# Patient Record
Sex: MALE | Race: WHITE | NOT HISPANIC OR LATINO | Employment: FULL TIME | URBAN - METROPOLITAN AREA
[De-identification: names, ages, dates, MRNs, and addresses within clinical notes are randomized per-mention and may not be internally consistent; named-entity substitution may affect disease eponyms.]

---

## 2020-01-13 ENCOUNTER — OCCUPATIONAL HEALTH (OUTPATIENT)
Dept: URGENT CARE | Facility: CLINIC | Age: 50
End: 2020-01-13

## 2020-01-13 DIAGNOSIS — Z02.83 ENCOUNTER FOR EMPLOYMENT-RELATED DRUG TESTING: ICD-10-CM

## 2020-01-13 PROCEDURE — 80305 DRUG TEST PRSMV DIR OPT OBS: CPT | Mod: S$GLB,,, | Performed by: EMERGENCY MEDICINE

## 2020-01-13 PROCEDURE — 80305 PR COLLECTION ONLY DRUG SCREEN: ICD-10-PCS | Mod: S$GLB,,, | Performed by: EMERGENCY MEDICINE

## 2020-01-24 ENCOUNTER — OCCUPATIONAL HEALTH (OUTPATIENT)
Dept: URGENT CARE | Facility: CLINIC | Age: 50
End: 2020-01-24

## 2020-01-24 PROCEDURE — 99499 UNLISTED E&M SERVICE: CPT | Mod: S$GLB,,, | Performed by: NURSE PRACTITIONER

## 2020-01-24 PROCEDURE — 99499 PR PHYSICAL - DOT/CDL: ICD-10-PCS | Mod: S$GLB,,, | Performed by: NURSE PRACTITIONER

## 2020-10-05 ENCOUNTER — OCCUPATIONAL HEALTH (OUTPATIENT)
Dept: URGENT CARE | Facility: CLINIC | Age: 50
End: 2020-10-05

## 2020-10-05 DIAGNOSIS — Z02.83 ENCOUNTER FOR EMPLOYMENT-RELATED DRUG TESTING: ICD-10-CM

## 2020-10-05 PROCEDURE — 80305 PR COLLECTION ONLY DRUG SCREEN: ICD-10-PCS | Mod: S$GLB,,, | Performed by: EMERGENCY MEDICINE

## 2020-10-05 PROCEDURE — 80305 DRUG TEST PRSMV DIR OPT OBS: CPT | Mod: S$GLB,,, | Performed by: EMERGENCY MEDICINE

## 2021-01-23 ENCOUNTER — OCCUPATIONAL HEALTH (OUTPATIENT)
Dept: URGENT CARE | Facility: CLINIC | Age: 51
End: 2021-01-23

## 2021-01-23 PROCEDURE — 99499 PR PHYSICAL - DOT/CDL: ICD-10-PCS | Mod: S$GLB,,, | Performed by: NURSE PRACTITIONER

## 2021-01-23 PROCEDURE — 99499 UNLISTED E&M SERVICE: CPT | Mod: S$GLB,,, | Performed by: NURSE PRACTITIONER

## 2021-04-13 ENCOUNTER — CLINICAL SUPPORT (OUTPATIENT)
Dept: URGENT CARE | Facility: CLINIC | Age: 51
End: 2021-04-13

## 2021-04-13 ENCOUNTER — OCCUPATIONAL HEALTH (OUTPATIENT)
Dept: URGENT CARE | Facility: CLINIC | Age: 51
End: 2021-04-13

## 2021-04-13 PROCEDURE — 99499 UNLISTED E&M SERVICE: CPT | Mod: S$GLB,,, | Performed by: EMERGENCY MEDICINE

## 2021-04-13 PROCEDURE — 80305 PR COLLECTION ONLY DRUG SCREEN: ICD-10-PCS | Mod: S$GLB,,, | Performed by: EMERGENCY MEDICINE

## 2021-04-13 PROCEDURE — 80305 DRUG TEST PRSMV DIR OPT OBS: CPT | Mod: S$GLB,,, | Performed by: EMERGENCY MEDICINE

## 2021-04-13 PROCEDURE — 99499 PR PHYSICAL - DOT/CDL: ICD-10-PCS | Mod: S$GLB,,, | Performed by: EMERGENCY MEDICINE

## 2022-04-05 ENCOUNTER — OCCUPATIONAL HEALTH (OUTPATIENT)
Dept: URGENT CARE | Facility: CLINIC | Age: 52
End: 2022-04-05
Payer: COMMERCIAL

## 2022-04-05 PROCEDURE — 99499 PR PHYSICAL - DOT/CDL: ICD-10-PCS | Mod: S$GLB,,, | Performed by: EMERGENCY MEDICINE

## 2022-04-05 PROCEDURE — 99499 UNLISTED E&M SERVICE: CPT | Mod: S$GLB,,, | Performed by: EMERGENCY MEDICINE

## 2022-09-29 ENCOUNTER — OCCUPATIONAL HEALTH (OUTPATIENT)
Dept: URGENT CARE | Facility: CLINIC | Age: 52
End: 2022-09-29

## 2022-09-29 PROCEDURE — 80305 DRUG TEST PRSMV DIR OPT OBS: CPT | Mod: S$GLB,,, | Performed by: EMERGENCY MEDICINE

## 2022-09-29 PROCEDURE — 99499 PR PHYSICAL - DOT/CDL: ICD-10-PCS | Mod: S$GLB,,, | Performed by: EMERGENCY MEDICINE

## 2022-09-29 PROCEDURE — 80305 PR COLLECTION ONLY DRUG SCREEN: ICD-10-PCS | Mod: S$GLB,,, | Performed by: EMERGENCY MEDICINE

## 2022-09-29 PROCEDURE — 99499 UNLISTED E&M SERVICE: CPT | Mod: S$GLB,,, | Performed by: EMERGENCY MEDICINE

## 2023-02-09 ENCOUNTER — OFFICE VISIT (OUTPATIENT)
Dept: URGENT CARE | Facility: CLINIC | Age: 53
End: 2023-02-09
Payer: OTHER MISCELLANEOUS

## 2023-02-09 VITALS
HEIGHT: 73 IN | WEIGHT: 293.63 LBS | DIASTOLIC BLOOD PRESSURE: 69 MMHG | HEART RATE: 88 BPM | TEMPERATURE: 97 F | RESPIRATION RATE: 20 BRPM | BODY MASS INDEX: 38.92 KG/M2 | OXYGEN SATURATION: 98 % | SYSTOLIC BLOOD PRESSURE: 106 MMHG

## 2023-02-09 DIAGNOSIS — Z02.6 ENCOUNTER RELATED TO WORKER'S COMPENSATION CLAIM: Primary | ICD-10-CM

## 2023-02-09 DIAGNOSIS — M25.562 ACUTE PAIN OF LEFT KNEE: ICD-10-CM

## 2023-02-09 PROCEDURE — 99214 PR OFFICE/OUTPT VISIT, EST, LEVL IV, 30-39 MIN: ICD-10-PCS | Mod: S$GLB,,, | Performed by: NURSE PRACTITIONER

## 2023-02-09 PROCEDURE — 73562 X-RAY EXAM OF KNEE 3: CPT | Mod: LT,S$GLB,, | Performed by: RADIOLOGY

## 2023-02-09 PROCEDURE — 73562 XR KNEE 3 VIEW LEFT: ICD-10-PCS | Mod: LT,S$GLB,, | Performed by: RADIOLOGY

## 2023-02-09 PROCEDURE — 99214 OFFICE O/P EST MOD 30 MIN: CPT | Mod: S$GLB,,, | Performed by: NURSE PRACTITIONER

## 2023-02-09 RX ORDER — CELECOXIB 200 MG/1
200 CAPSULE ORAL 2 TIMES DAILY PRN
Qty: 14 CAPSULE | Refills: 0 | Status: SHIPPED | OUTPATIENT
Start: 2023-02-09 | End: 2023-02-16

## 2023-02-09 NOTE — PROGRESS NOTES
"Subjective:       Patient ID: Cooper Ruggiero is a 52 y.o. male.    Chief Complaint: W/C N/V    W/C N/V DOI: 2/7/23 " Pt states his left knee just started hurting two days ago. 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      Constitution: Negative for chills and fever.   Musculoskeletal:  Positive for pain, joint pain, joint swelling, abnormal ROM of joint and pain with walking. Negative for trauma.   Skin:  Negative for erythema and bruising.   Neurological:  Negative for numbness and tingling.      Objective:      Physical Exam  Vitals and nursing note reviewed.   Constitutional:       General: He is not in acute distress.     Appearance: He is obese. He is ill-appearing. He is not toxic-appearing or diaphoretic.   HENT:      Head: Normocephalic and atraumatic.      Right Ear: External ear normal.      Left Ear: External ear normal.      Nose: Nose normal.      Mouth/Throat:      Mouth: Mucous membranes are moist.      Pharynx: Oropharynx is clear.   Eyes:      General: No scleral icterus.     Conjunctiva/sclera: Conjunctivae normal.   Cardiovascular:      Rate and Rhythm: Normal rate and regular rhythm.      Pulses: Normal pulses.      Heart sounds: Normal heart sounds.   Pulmonary:      Effort: Pulmonary effort is normal. No accessory muscle usage or respiratory distress.      Breath sounds: Normal air entry. No stridor. Examination of the right-upper field reveals wheezing and rhonchi. Examination of the left-upper field reveals wheezing and rhonchi. Examination of the right-middle field reveals wheezing. Examination of the left-middle field reveals wheezing. Examination of the right-lower field reveals decreased breath sounds. Examination of the left-lower field reveals wheezing. Decreased breath sounds, wheezing and rhonchi present. No rales.      Comments: Pt. Reports Being treated " "for "pneumonia" by PCP  Musculoskeletal:      Cervical back: Normal range of motion. No tenderness.      Left knee: Swelling and effusion present. No deformity, erythema, ecchymosis, lacerations, bony tenderness or crepitus. Decreased range of motion. Tenderness (generalized) present. No medial joint line, lateral joint line, MCL, LCL, ACL, PCL or patellar tendon tenderness. No LCL laxity, MCL laxity, ACL laxity or PCL laxity.Normal alignment, normal meniscus and normal patellar mobility. Normal pulse.      Instability Tests: Anterior drawer test negative. Posterior drawer test negative.        Legs:    Skin:     General: Skin is warm and dry.      Capillary Refill: Capillary refill takes 2 to 3 seconds.      Findings: No erythema.   Neurological:      Mental Status: He is alert and oriented to person, place, and time.   Psychiatric:         Behavior: Behavior normal.         Thought Content: Thought content normal.         Judgment: Judgment normal.       Assessment:       1. Encounter related to worker's compensation claim    2. Acute pain of left knee          Plan:         Medications Ordered This Encounter   Medications    celecoxib (CELEBREX) 200 MG capsule     Sig: Take 1 capsule (200 mg total) by mouth 2 (two) times daily as needed for Pain.     Dispense:  14 capsule     Refill:  0     Patient Instructions: Attention not to aggravate affected area, Elevated affected area, Referral to specialist to be scheduled, once authorized, Use splint as directed, Use Crutches as directed   Restrictions: Avoid climbing/kneeling/squatting, Avoid frequent bending/lifting/twisting, No Prolonged standing/walking, No driving company vehicles  Follow up if symptoms worsen or fail to improve.         I have discussed the xray results and physical exam findings with the patient.  We discussed symptom monitoring, conservative care methods, medication use, and follow up orders. He verbalized understanding and agreement with the " plan of care.        Rest, and elevate left knee until better  Apply ice for 15 minutes every 2 hours. After 48 hours, may use moist heat or heating pad  Wear immobilizer at all times for comfort and stability   takes Celebrex as needed for pain.  Do not take any additional over-the-counter NSAIDs.  May add Tylenol between doses as needed  Follow-up with orthopedic referral once scheduled  Return to this clinic 02/16/23 for reevaluation and further management   Follow up with [facility]  [facility] [address] [address]  [phone]  Return to clinic as needed for new or worse symptoms.      EXAMINATION:  XR KNEE 3 VIEW LEFT     CLINICAL HISTORY:  Encounter for examination for insurance purposes     TECHNIQUE:  AP, lateral, and Merchant views of the left knee were performed.     COMPARISON:  None     FINDINGS:  There is no evidence fracture nor malalignment.  There is a joint effusion.     Impression:     There is a left knee joint effusion.        Electronically signed by: Dona Marin MD  Date:                                            02/09/2023  Time:                                           12:28

## 2023-02-09 NOTE — PATIENT INSTRUCTIONS
Rest, and elevate left knee until better  Apply ice for 15 minutes every 2 hours. After 48 hours, may use moist heat or heating pad  Wear immobilizer at all times for comfort and stability   takes Celebrex as needed for pain.  Do not take any additional over-the-counter NSAIDs.  May add Tylenol between doses as needed  Follow-up with orthopedic referral once scheduled  Return to this clinic 02/16/23 for reevaluation and further management   Follow up with [facility]  [facility] [address] [address]  [phone]  Return to clinic as needed for new or worse symptoms.

## 2023-08-31 ENCOUNTER — OFFICE VISIT (OUTPATIENT)
Dept: CARDIOLOGY | Facility: CLINIC | Age: 53
End: 2023-08-31
Payer: COMMERCIAL

## 2023-08-31 VITALS
HEART RATE: 103 BPM | BODY MASS INDEX: 38.83 KG/M2 | OXYGEN SATURATION: 97 % | WEIGHT: 293 LBS | HEIGHT: 73 IN | SYSTOLIC BLOOD PRESSURE: 87 MMHG | DIASTOLIC BLOOD PRESSURE: 55 MMHG

## 2023-08-31 DIAGNOSIS — F17.200 CURRENT SMOKER: ICD-10-CM

## 2023-08-31 DIAGNOSIS — I10 ESSENTIAL HYPERTENSION: ICD-10-CM

## 2023-08-31 DIAGNOSIS — E78.2 MIXED HYPERLIPIDEMIA: ICD-10-CM

## 2023-08-31 DIAGNOSIS — R94.31 NONSPECIFIC ABNORMAL ELECTROCARDIOGRAM (ECG) (EKG): ICD-10-CM

## 2023-08-31 DIAGNOSIS — E66.01 SEVERE OBESITY: ICD-10-CM

## 2023-08-31 DIAGNOSIS — R42 DIZZINESS: ICD-10-CM

## 2023-08-31 DIAGNOSIS — R42 ORTHOSTATIC DIZZINESS: Primary | ICD-10-CM

## 2023-08-31 DIAGNOSIS — E08.65 DIABETES MELLITUS DUE TO UNDERLYING CONDITION WITH HYPERGLYCEMIA, WITHOUT LONG-TERM CURRENT USE OF INSULIN: ICD-10-CM

## 2023-08-31 PROCEDURE — 3074F SYST BP LT 130 MM HG: CPT | Mod: CPTII,S$GLB,, | Performed by: INTERNAL MEDICINE

## 2023-08-31 PROCEDURE — 99205 PR OFFICE/OUTPT VISIT, NEW, LEVL V, 60-74 MIN: ICD-10-PCS | Mod: S$GLB,,, | Performed by: INTERNAL MEDICINE

## 2023-08-31 PROCEDURE — 1160F PR REVIEW ALL MEDS BY PRESCRIBER/CLIN PHARMACIST DOCUMENTED: ICD-10-PCS | Mod: CPTII,S$GLB,, | Performed by: INTERNAL MEDICINE

## 2023-08-31 PROCEDURE — 4010F ACE/ARB THERAPY RXD/TAKEN: CPT | Mod: CPTII,S$GLB,, | Performed by: INTERNAL MEDICINE

## 2023-08-31 PROCEDURE — 3074F PR MOST RECENT SYSTOLIC BLOOD PRESSURE < 130 MM HG: ICD-10-PCS | Mod: CPTII,S$GLB,, | Performed by: INTERNAL MEDICINE

## 2023-08-31 PROCEDURE — 3008F BODY MASS INDEX DOCD: CPT | Mod: CPTII,S$GLB,, | Performed by: INTERNAL MEDICINE

## 2023-08-31 PROCEDURE — 99999 PR PBB SHADOW E&M-EST. PATIENT-LVL III: ICD-10-PCS | Mod: PBBFAC,,, | Performed by: INTERNAL MEDICINE

## 2023-08-31 PROCEDURE — 3008F PR BODY MASS INDEX (BMI) DOCUMENTED: ICD-10-PCS | Mod: CPTII,S$GLB,, | Performed by: INTERNAL MEDICINE

## 2023-08-31 PROCEDURE — 93000 ELECTROCARDIOGRAM COMPLETE: CPT | Mod: S$GLB,,, | Performed by: INTERNAL MEDICINE

## 2023-08-31 PROCEDURE — 1160F RVW MEDS BY RX/DR IN RCRD: CPT | Mod: CPTII,S$GLB,, | Performed by: INTERNAL MEDICINE

## 2023-08-31 PROCEDURE — 3078F DIAST BP <80 MM HG: CPT | Mod: CPTII,S$GLB,, | Performed by: INTERNAL MEDICINE

## 2023-08-31 PROCEDURE — 93000 EKG 12-LEAD: ICD-10-PCS | Mod: S$GLB,,, | Performed by: INTERNAL MEDICINE

## 2023-08-31 PROCEDURE — 1159F PR MEDICATION LIST DOCUMENTED IN MEDICAL RECORD: ICD-10-PCS | Mod: CPTII,S$GLB,, | Performed by: INTERNAL MEDICINE

## 2023-08-31 PROCEDURE — 99205 OFFICE O/P NEW HI 60 MIN: CPT | Mod: S$GLB,,, | Performed by: INTERNAL MEDICINE

## 2023-08-31 PROCEDURE — 3078F PR MOST RECENT DIASTOLIC BLOOD PRESSURE < 80 MM HG: ICD-10-PCS | Mod: CPTII,S$GLB,, | Performed by: INTERNAL MEDICINE

## 2023-08-31 PROCEDURE — 99999 PR PBB SHADOW E&M-EST. PATIENT-LVL III: CPT | Mod: PBBFAC,,, | Performed by: INTERNAL MEDICINE

## 2023-08-31 PROCEDURE — 1159F MED LIST DOCD IN RCRD: CPT | Mod: CPTII,S$GLB,, | Performed by: INTERNAL MEDICINE

## 2023-08-31 PROCEDURE — 4010F PR ACE/ARB THEARPY RXD/TAKEN: ICD-10-PCS | Mod: CPTII,S$GLB,, | Performed by: INTERNAL MEDICINE

## 2023-08-31 RX ORDER — LISINOPRIL 5 MG/1
5 TABLET ORAL DAILY
Qty: 90 TABLET | Refills: 3 | Status: SHIPPED | OUTPATIENT
Start: 2023-08-31 | End: 2023-11-14

## 2023-08-31 RX ORDER — LISINOPRIL AND HYDROCHLOROTHIAZIDE 12.5; 2 MG/1; MG/1
1 TABLET ORAL DAILY
COMMUNITY
Start: 2023-08-20

## 2023-08-31 RX ORDER — ALBUTEROL SULFATE 90 UG/1
2 AEROSOL, METERED RESPIRATORY (INHALATION) EVERY 4 HOURS PRN
COMMUNITY
Start: 2023-05-28

## 2023-08-31 RX ORDER — HYDROCHLOROTHIAZIDE 12.5 MG/1
12.5 TABLET ORAL DAILY
Qty: 30 TABLET | Refills: 11 | Status: SHIPPED | OUTPATIENT
Start: 2023-08-31 | End: 2023-11-14

## 2023-08-31 RX ORDER — CYCLOBENZAPRINE HCL 10 MG
10 TABLET ORAL NIGHTLY
COMMUNITY
Start: 2023-05-27 | End: 2023-08-31

## 2023-08-31 RX ORDER — DULAGLUTIDE 4.5 MG/.5ML
4.5 INJECTION, SOLUTION SUBCUTANEOUS
COMMUNITY
Start: 2023-08-16

## 2023-08-31 NOTE — PROGRESS NOTES
Subjective:    Patient ID:  Cooper Ruggiero is a 52 y.o. male     Chief Complaint   Patient presents with    Hypertension     New Patient establish care . Leg arm falls asleep, numbness at night time only .        HPI:  Mr Cooper Ruggiero is a 52 y.o. male here for initial consultation.    Patient is referred here for hypertension evaluation.    Patient has been doing well no specific complaints at the present time.  Today he felt dizzy while coming into the clinic.  Otherwise has been doing well denies any chest pain or tightness or heaviness denies any dizziness or lightheadedness or loss of consciousness.  And his breathing has been stable.  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  And he also has borderline diabetes.  Patient had been a smoker in the past had smoked for more than 30-35 years and he was initially heavy smoker of smoking about 2 packs per day and has decreased to half a packet per day.        Review of patient's allergies indicates:  No Known Allergies    No past medical history on file.  No past surgical history on file.     No family history on file.     Review of Systems:   Constitution: Negative for diaphoresis and fever.   HEENT: Negative for nosebleeds.    Cardiovascular: Negative for chest pain       No dyspnea on exertion       No leg swelling        No palpitations  Respiratory: Negative for shortness of breath and wheezing.    Hematologic/Lymphatic: Negative for bleeding problem. Does not bruise/bleed easily.   Skin: Negative for color change and rash.   Musculoskeletal: Negative for falls and myalgias.   Gastrointestinal: Negative for hematemesis and hematochezia.   Genitourinary: Negative for hematuria.   Neurological: Negative for dizziness and light-headedness.   Psychiatric/Behavioral: Negative for altered mental status and memory loss.          Objective:        Vitals:    08/31/23 1333   BP: (!) 87/55   Pulse:  "103       No results found for: "WBC", "HGB", "HCT", "PLT", "CHOL", "TRIG", "HDL", "LDLDIRECT", "ALT", "AST", "NA", "K", "CL", "CREATININE", "BUN", "CO2", "TSH", "PSA", "INR", "GLUF", "HGBA1C", "MICROALBUR"     ECHOCARDIOGRAM RESULTS  No results found for this or any previous visit.        CURRENT/PREVIOUS VISIT EKG  No results found for this or any previous visit.  No valid procedures specified.   No results found for this or any previous visit.      Physical Exam:  CONSTITUTIONAL: No fever, no chills  HEENT: Normocephalic, atraumatic,pupils reactive to light                 NECK:  No JVD no carotid bruit  CVS: S1S2+, RRR, systolic murmurs,   LUNGS: Clear  ABDOMEN: Soft, NT, BS+  EXTREMITIES: No cyanosis, edema  : No greenfield catheter  NEURO: AAO X 3  PSY: Normal affect      Medication List with Changes/Refills   Current Medications    ALBUTEROL (PROVENTIL/VENTOLIN HFA) 90 MCG/ACTUATION INHALER    2 puffs every 4 (four) hours as needed.    LISINOPRIL-HYDROCHLOROTHIAZIDE (PRINZIDE,ZESTORETIC) 20-12.5 MG PER TABLET    Take 1 tablet by mouth.    TRULICITY 4.5 MG/0.5 ML PEN INJECTOR    SMARTSI.5 Milligram(s) SUB-Q Once a Week   Discontinued Medications    CYCLOBENZAPRINE (FLEXERIL) 10 MG TABLET    Take 10 mg by mouth every evening.             Assessment:       1. Orthostatic dizziness    2. Essential hypertension    3. Mixed hyperlipidemia    4. Severe obesity    5. Nonspecific abnormal electrocardiogram (ECG) (EKG)    6. Current smoker         Plan:   1. Orthostatic dizziness   Patient is currently on lisinopril-hydrochlorothiazide 20-12.5 mg p.o. daily.  And at the present time patient's blood pressure is 87/55  Will suppress the medication need to decrease the dosage of the medication.  Will continue hydrochlorothiazide at 12.5 mg p.o. daily and patient will be on lisinopril 5 mg at bedtime.    Will increase the doses according to his blood pressure reading.  Patient to check his blood pressure as soon as he " wakes up, before breakfast before lunch before dinner and before going to bed and needs to do it for 5 days.  2. Essential hypertension   Will also do a 2D echocardiogram for LV function ejection fraction wall motion abnormality.    3. Nonspecific ST T-wave changes and EKG abnormality.    Patient has been a heavy smoker in the past and also has underlying obesity will need further cardiac evaluation with nonspecific ST T wave changes.  Will schedule him for Lexiscan Cardiolite study to evaluate for ischemia.  4. Mixed hyperlipidemia     Patient to follow-up with his primary physician in regards with his cholesterol and lipid panel and liver function tests.  5. Diabetes mellitus   Patient was started on Trulicity and he follows up with his primary physician in regards with is HbA1c.  6. EKG  Reviewed his EKG independently patient is in normal sinus rhythm with a heart rate of 93 beats per minute and nonspecific ST T-wave changes and normal intervals.  7. I will see him back in the office after the testing is completed.              Problem List Items Addressed This Visit    None  Visit Diagnoses       Orthostatic dizziness    -  Primary    Essential hypertension        Mixed hyperlipidemia        Severe obesity        Nonspecific abnormal electrocardiogram (ECG) (EKG)        Current smoker                No follow-ups on file.

## 2023-09-01 ENCOUNTER — TELEPHONE (OUTPATIENT)
Dept: CARDIOLOGY | Facility: CLINIC | Age: 53
End: 2023-09-01
Payer: COMMERCIAL

## 2023-09-01 DIAGNOSIS — R42 DIZZINESS: Primary | ICD-10-CM

## 2023-09-02 NOTE — TELEPHONE ENCOUNTER
----- Message from Ivory Ward RN sent at 2023  9:35 AM CDT -----  Regardin/31 EKG Order  The EKG performed on 23 is missing an order.  Please place an order so that the EKG can be read in [place].    Thank you,  Ivory Ward RN  [facility]   [facility]  3rd Floor Cardiology Clinic  [phone]/ [id number]

## 2023-10-10 ENCOUNTER — TELEPHONE (OUTPATIENT)
Dept: CARDIOLOGY | Facility: HOSPITAL | Age: 53
End: 2023-10-10

## 2023-10-10 NOTE — TELEPHONE ENCOUNTER
Patient advised, test will be at [facility] [facility]   Will need to register on the first floor at the main entrance.   Patient advised that arrival time is 9:15am.  Patient advised that he may be here about 2.5-3 hours, and may want to bring something to occupy their time, as there will be periods of waiting.    Patient advised, may take his medications prior to testing if you need to.  Advised if he needs to eat to take his medications, please keep it light, like toast and juice.    Patient advised to avoid all caffeine 12 hours prior to testing.  This includes decaf tea and coffee.    Will provide peanut butter crackers for a snack after stress test.  If patient would prefer something else, please bring a snack from home.    Wear comfortable clothing.   No lotions, oils, or powders to the upper chest area. May wear deodorant.    No metal jewelry, buttons, or zippers to the upper body.  Patient verbalizes understanding of instructions.

## 2023-10-11 ENCOUNTER — HOSPITAL ENCOUNTER (OUTPATIENT)
Dept: CARDIOLOGY | Facility: HOSPITAL | Age: 53
Discharge: HOME | End: 2023-10-11
Attending: INTERNAL MEDICINE
Payer: COMMERCIAL

## 2023-10-11 ENCOUNTER — HOSPITAL ENCOUNTER (OUTPATIENT)
Dept: RADIOLOGY | Facility: HOSPITAL | Age: 53
Discharge: HOME | End: 2023-10-11
Attending: INTERNAL MEDICINE
Payer: COMMERCIAL

## 2023-10-11 VITALS — HEIGHT: 73 IN | WEIGHT: 293 LBS | BODY MASS INDEX: 38.83 KG/M2

## 2023-10-11 DIAGNOSIS — R94.31 NONSPECIFIC ABNORMAL ELECTROCARDIOGRAM (ECG) (EKG): ICD-10-CM

## 2023-10-11 DIAGNOSIS — E78.2 MIXED HYPERLIPIDEMIA: ICD-10-CM

## 2023-10-11 DIAGNOSIS — E66.01 SEVERE OBESITY: ICD-10-CM

## 2023-10-11 DIAGNOSIS — I10 ESSENTIAL HYPERTENSION: ICD-10-CM

## 2023-10-11 DIAGNOSIS — F17.200 CURRENT SMOKER: ICD-10-CM

## 2023-10-11 DIAGNOSIS — R42 ORTHOSTATIC DIZZINESS: ICD-10-CM

## 2023-10-11 DIAGNOSIS — E08.65 DIABETES MELLITUS DUE TO UNDERLYING CONDITION WITH HYPERGLYCEMIA, WITHOUT LONG-TERM CURRENT USE OF INSULIN: ICD-10-CM

## 2023-10-11 LAB
AORTIC ROOT ANNULUS: 2.7 CM
AORTIC VALVE CUSP SEPERATION: 1.6 CM
AV INDEX (PROSTH): 0.94
AV MEAN GRADIENT: 4 MMHG
AV PEAK GRADIENT: 8 MMHG
AV VALVE AREA BY VELOCITY RATIO: 2.63 CM²
AV VALVE AREA: 2.96 CM²
AV VELOCITY RATIO: 0.84
BSA FOR ECHO PROCEDURE: 2.62 M2
CV ECHO LV RWT: 0.32 CM
DOP CALC AO PEAK VEL: 1.41 M/S
DOP CALC AO VTI: 22.9 CM
DOP CALC LVOT AREA: 3.1 CM2
DOP CALC LVOT DIAMETER: 2 CM
DOP CALC LVOT PEAK VEL: 1.18 M/S
DOP CALC LVOT STROKE VOLUME: 67.82 CM3
DOP CALCLVOT PEAK VEL VTI: 21.6 CM
E WAVE DECELERATION TIME: 201 MSEC
E/A RATIO: 0.84
E/E' RATIO: 8 M/S
ECHO LV POSTERIOR WALL: 0.78 CM (ref 0.6–1.1)
FRACTIONAL SHORTENING: 29 % (ref 28–44)
INTERVENTRICULAR SEPTUM: 1.07 CM (ref 0.6–1.1)
IVRT: 84 MSEC
LEFT ATRIUM SIZE: 3.7 CM
LEFT INTERNAL DIMENSION IN SYSTOLE: 3.53 CM (ref 2.1–4)
LEFT VENTRICLE DIASTOLIC VOLUME INDEX: 45.85 ML/M2
LEFT VENTRICLE DIASTOLIC VOLUME: 116 ML
LEFT VENTRICLE MASS INDEX: 64 G/M2
LEFT VENTRICLE SYSTOLIC VOLUME INDEX: 20.5 ML/M2
LEFT VENTRICLE SYSTOLIC VOLUME: 51.9 ML
LEFT VENTRICULAR INTERNAL DIMENSION IN DIASTOLE: 4.95 CM (ref 3.5–6)
LEFT VENTRICULAR MASS: 161.3 G
LV LATERAL E/E' RATIO: 8.44 M/S
LV SEPTAL E/E' RATIO: 7.6 M/S
LVOT MG: 3 MMHG
LVOT MV: 0.72 CM/S
MV PEAK A VEL: 0.91 M/S
MV PEAK E VEL: 0.76 M/S
MV STENOSIS PRESSURE HALF TIME: 47 MS
MV VALVE AREA P 1/2 METHOD: 4.68 CM2
PISA TR MAX VEL: 1.76 M/S
RIGHT VENTRICULAR END-DIASTOLIC DIMENSION: 2.84 CM
TDI LATERAL: 0.09 M/S
TDI SEPTAL: 0.1 M/S
TDI: 0.1 M/S
TR MAX PG: 12 MMHG
Z-SCORE OF LEFT VENTRICULAR DIMENSION IN END DIASTOLE: -11.15
Z-SCORE OF LEFT VENTRICULAR DIMENSION IN END SYSTOLE: -7.33

## 2023-10-11 PROCEDURE — 93306 TTE W/DOPPLER COMPLETE: CPT | Mod: 26,,, | Performed by: INTERNAL MEDICINE

## 2023-10-11 PROCEDURE — 78452 HT MUSCLE IMAGE SPECT MULT: CPT | Mod: 26,,, | Performed by: INTERNAL MEDICINE

## 2023-10-11 PROCEDURE — 93018 CV STRESS TEST I&R ONLY: CPT | Mod: ,,, | Performed by: INTERNAL MEDICINE

## 2023-10-11 PROCEDURE — 93018 NUCLEAR STRESS - CARDIOLOGY INTERPRETED (CUPID ONLY): ICD-10-PCS | Mod: ,,, | Performed by: INTERNAL MEDICINE

## 2023-10-11 PROCEDURE — 93016 CV STRESS TEST SUPVJ ONLY: CPT | Mod: ,,, | Performed by: NURSE PRACTITIONER

## 2023-10-11 PROCEDURE — 78452 HT MUSCLE IMAGE SPECT MULT: CPT

## 2023-10-11 PROCEDURE — 93016 NUCLEAR STRESS - CARDIOLOGY INTERPRETED (CUPID ONLY): ICD-10-PCS | Mod: ,,, | Performed by: NURSE PRACTITIONER

## 2023-10-11 PROCEDURE — 78452 NUCLEAR STRESS - CARDIOLOGY INTERPRETED (CUPID ONLY): ICD-10-PCS | Mod: 26,,, | Performed by: INTERNAL MEDICINE

## 2023-10-11 PROCEDURE — 93306 TTE W/DOPPLER COMPLETE: CPT

## 2023-10-11 PROCEDURE — 93306 ECHO (CUPID ONLY): ICD-10-PCS | Mod: 26,,, | Performed by: INTERNAL MEDICINE

## 2023-10-11 PROCEDURE — A9502 TC99M TETROFOSMIN: HCPCS

## 2023-10-11 RX ORDER — REGADENOSON 0.08 MG/ML
0.4 INJECTION, SOLUTION INTRAVENOUS ONCE
Status: COMPLETED | OUTPATIENT
Start: 2023-10-11 | End: 2023-10-11

## 2023-10-11 RX ADMIN — REGADENOSON 0.4 MG: 0.08 INJECTION, SOLUTION INTRAVENOUS at 09:10

## 2023-10-12 ENCOUNTER — HOSPITAL ENCOUNTER (OUTPATIENT)
Dept: RADIOLOGY | Facility: HOSPITAL | Age: 53
Discharge: HOME | End: 2023-10-12
Attending: INTERNAL MEDICINE
Payer: COMMERCIAL

## 2023-10-12 LAB
CV PHARM DOSE: 0.4 MG
CV STRESS BASE HR: 90 BPM
DIASTOLIC BLOOD PRESSURE: 82 MMHG
EJECTION FRACTION- HIGH: 65 %
END DIASTOLIC INDEX-HIGH: 153 ML/M2
END DIASTOLIC INDEX-LOW: 93 ML/M2
END SYSTOLIC INDEX-HIGH: 71 ML/M2
END SYSTOLIC INDEX-LOW: 31 ML/M2
NUC REST DIASTOLIC VOLUME INDEX: 74
NUC REST EJECTION FRACTION: 66
NUC REST SYSTOLIC VOLUME INDEX: 25
NUC STRESS DIASTOLIC VOLUME INDEX: 74
NUC STRESS EJECTION FRACTION: 59 %
NUC STRESS SYSTOLIC VOLUME INDEX: 30
OHS CV CPX 1 MINUTE RECOVERY HEART RATE: 90 BPM
OHS CV CPX 85 PERCENT MAX PREDICTED HEART RATE MALE: 143
OHS CV CPX MAX PREDICTED HEART RATE: 168
OHS CV CPX PATIENT IS FEMALE: 0
OHS CV CPX PATIENT IS MALE: 1
OHS CV CPX PEAK DIASTOLIC BLOOD PRESSURE: 56 MMHG
OHS CV CPX PEAK HEAR RATE: 105 BPM
OHS CV CPX PEAK RATE PRESSURE PRODUCT: NORMAL
OHS CV CPX PEAK SYSTOLIC BLOOD PRESSURE: 100 MMHG
OHS CV CPX PERCENT MAX PREDICTED HEART RATE ACHIEVED: 63
OHS CV CPX RATE PRESSURE PRODUCT PRESENTING: 9720
RETIRED EF AND QEF - SEE NOTES: 53 %
SYSTOLIC BLOOD PRESSURE: 108 MMHG

## 2023-10-23 ENCOUNTER — OFFICE VISIT (OUTPATIENT)
Dept: CARDIOLOGY | Facility: CLINIC | Age: 53
End: 2023-10-23
Payer: COMMERCIAL

## 2023-10-23 VITALS
BODY MASS INDEX: 39.1 KG/M2 | DIASTOLIC BLOOD PRESSURE: 70 MMHG | HEART RATE: 93 BPM | WEIGHT: 295 LBS | HEIGHT: 73 IN | OXYGEN SATURATION: 98 % | RESPIRATION RATE: 16 BRPM | SYSTOLIC BLOOD PRESSURE: 118 MMHG

## 2023-10-23 DIAGNOSIS — E78.2 MIXED HYPERLIPIDEMIA: ICD-10-CM

## 2023-10-23 DIAGNOSIS — R94.39 ABNORMAL FINDING ON CARDIOVASCULAR STRESS TEST: Primary | ICD-10-CM

## 2023-10-23 DIAGNOSIS — F17.200 CURRENT SMOKER: ICD-10-CM

## 2023-10-23 DIAGNOSIS — I10 ESSENTIAL HYPERTENSION: ICD-10-CM

## 2023-10-23 DIAGNOSIS — E08.65 DIABETES MELLITUS DUE TO UNDERLYING CONDITION WITH HYPERGLYCEMIA, WITHOUT LONG-TERM CURRENT USE OF INSULIN: ICD-10-CM

## 2023-10-23 DIAGNOSIS — Z01.810 PREOP CARDIOVASCULAR EXAM: ICD-10-CM

## 2023-10-23 DIAGNOSIS — R42 ORTHOSTATIC DIZZINESS: ICD-10-CM

## 2023-10-23 DIAGNOSIS — E66.01 SEVERE OBESITY: ICD-10-CM

## 2023-10-23 PROCEDURE — 99215 OFFICE O/P EST HI 40 MIN: CPT | Mod: S$GLB,,, | Performed by: INTERNAL MEDICINE

## 2023-10-23 PROCEDURE — 1160F RVW MEDS BY RX/DR IN RCRD: CPT | Mod: CPTII,S$GLB,, | Performed by: INTERNAL MEDICINE

## 2023-10-23 PROCEDURE — 3078F PR MOST RECENT DIASTOLIC BLOOD PRESSURE < 80 MM HG: ICD-10-PCS | Mod: CPTII,S$GLB,, | Performed by: INTERNAL MEDICINE

## 2023-10-23 PROCEDURE — 1159F PR MEDICATION LIST DOCUMENTED IN MEDICAL RECORD: ICD-10-PCS | Mod: CPTII,S$GLB,, | Performed by: INTERNAL MEDICINE

## 2023-10-23 PROCEDURE — 3008F PR BODY MASS INDEX (BMI) DOCUMENTED: ICD-10-PCS | Mod: CPTII,S$GLB,, | Performed by: INTERNAL MEDICINE

## 2023-10-23 PROCEDURE — 4010F PR ACE/ARB THEARPY RXD/TAKEN: ICD-10-PCS | Mod: CPTII,S$GLB,, | Performed by: INTERNAL MEDICINE

## 2023-10-23 PROCEDURE — 99215 PR OFFICE/OUTPT VISIT, EST, LEVL V, 40-54 MIN: ICD-10-PCS | Mod: S$GLB,,, | Performed by: INTERNAL MEDICINE

## 2023-10-23 PROCEDURE — 3008F BODY MASS INDEX DOCD: CPT | Mod: CPTII,S$GLB,, | Performed by: INTERNAL MEDICINE

## 2023-10-23 PROCEDURE — 1160F PR REVIEW ALL MEDS BY PRESCRIBER/CLIN PHARMACIST DOCUMENTED: ICD-10-PCS | Mod: CPTII,S$GLB,, | Performed by: INTERNAL MEDICINE

## 2023-10-23 PROCEDURE — 3074F PR MOST RECENT SYSTOLIC BLOOD PRESSURE < 130 MM HG: ICD-10-PCS | Mod: CPTII,S$GLB,, | Performed by: INTERNAL MEDICINE

## 2023-10-23 PROCEDURE — 3078F DIAST BP <80 MM HG: CPT | Mod: CPTII,S$GLB,, | Performed by: INTERNAL MEDICINE

## 2023-10-23 PROCEDURE — 1159F MED LIST DOCD IN RCRD: CPT | Mod: CPTII,S$GLB,, | Performed by: INTERNAL MEDICINE

## 2023-10-23 PROCEDURE — 99999 PR PBB SHADOW E&M-EST. PATIENT-LVL III: ICD-10-PCS | Mod: PBBFAC,,, | Performed by: INTERNAL MEDICINE

## 2023-10-23 PROCEDURE — 99999 PR PBB SHADOW E&M-EST. PATIENT-LVL III: CPT | Mod: PBBFAC,,, | Performed by: INTERNAL MEDICINE

## 2023-10-23 PROCEDURE — 4010F ACE/ARB THERAPY RXD/TAKEN: CPT | Mod: CPTII,S$GLB,, | Performed by: INTERNAL MEDICINE

## 2023-10-23 PROCEDURE — 3074F SYST BP LT 130 MM HG: CPT | Mod: CPTII,S$GLB,, | Performed by: INTERNAL MEDICINE

## 2023-10-23 RX ORDER — SODIUM CHLORIDE 9 MG/ML
INJECTION, SOLUTION INTRAVENOUS ONCE
Status: CANCELLED | OUTPATIENT
Start: 2023-10-23 | End: 2023-10-23

## 2023-10-23 NOTE — PROGRESS NOTES
"Subjective:    Patient ID:  Cooper Ruggiero is a 52 y.o. male     Chief Complaint   Patient presents with    Results       HPI:  Mr Cooper Ruggiero is a 52 y.o. male is here for results of his stress test and echocardiogram.    Patient is scheduled for knee surgery next week.    And patient is concerned about his knee surgery because of the workmen comes issues.    Patient has an abnormal stress myocardial perfusion study.    Patient denies any chest pain or tightness or heaviness denies any shortness a breath or difficulty in breathing denies any dizziness or lightheadedness or loss of consciousness.        Review of patient's allergies indicates:  No Known Allergies    History reviewed. No pertinent past medical history.  History reviewed. No pertinent surgical history.  Social History     Tobacco Use    Smoking status: Every Day     Current packs/day: 0.50     Average packs/day: 0.5 packs/day for 35.4 years (17.7 ttl pk-yrs)     Types: Cigarettes     Start date: 06/1988    Smokeless tobacco: Former     Types: Chew     History reviewed. No pertinent family history.     Review of Systems:   Constitution: Negative for diaphoresis and fever.   HEENT: Negative for nosebleeds.    Cardiovascular: Negative for chest pain       Intermittent dyspnea on exertion       No leg swelling        No palpitations  Respiratory: Negative for shortness of breath and wheezing.    Hematologic/Lymphatic: Negative for bleeding problem. Does not bruise/bleed easily.   Skin: Negative for color change and rash.   Musculoskeletal: Negative for falls and myalgias.   Gastrointestinal: Negative for hematemesis and hematochezia.   Genitourinary: Negative for hematuria.   Neurological: Negative for dizziness and light-headedness.   Psychiatric/Behavioral: Negative for altered mental status and memory loss.          Objective:        Vitals:    10/23/23 1042   BP: 118/70   Pulse: 93   Resp: 16       No results found for: "WBC", "HGB", "HCT", " ""PLT", "CHOL", "TRIG", "HDL", "LDLDIRECT", "ALT", "AST", "NA", "K", "CL", "CREATININE", "BUN", "CO2", "TSH", "PSA", "INR", "GLUF", "HGBA1C", "MICROALBUR"     ECHOCARDIOGRAM RESULTS  Results for orders placed during the hospital encounter of 10/11/23    Echo    Interpretation Summary    Left Ventricle: The left ventricle is normal in size. Normal wall thickness. Normal wall motion. There is normal systolic function with a visually estimated ejection fraction of 55 - 60%. There is normal diastolic function.    Left Atrium: Left atrium is mildly dilated.    Right Ventricle: Normal right ventricular cavity size. Wall thickness is normal. Right ventricle wall motion  is normal. Systolic function is normal.        CURRENT/PREVIOUS VISIT EKG  Results for orders placed or performed in visit on 08/31/23   IN OFFICE EKG 12-LEAD (to [place])    Collection Time: 08/31/23  1:46 PM    Narrative    Test Reason : R42,    Vent. Rate : 093 BPM     Atrial Rate : 093 BPM     P-R Int : 140 ms          QRS Dur : 096 ms      QT Int : 364 ms       P-R-T Axes : 061 079 068 degrees     QTc Int : 452 ms    Normal sinus rhythm  Nonspecific T wave abnormality  Abnormal ECG  No previous ECGs available  Confirmed by Jayden Mosley MD ([id number]) on 9/21/2023 5:38:51 PM    Referred By: MARITZA PARSONS           Confirmed By:Jayden Mosley MD     No valid procedures specified.   Results for orders placed during the hospital encounter of 10/11/23    Nuclear Stress - Cardiology Interpreted    Interpretation Summary    Abnormal myocardial perfusion scan.    There is a severe intensity, large sized, reversible perfusion abnormality that is consistent with ischemia in the inferior wall(s).    There are no other significant perfusion abnormalities.    The gated perfusion images showed an ejection fraction of 66% at rest. The gated perfusion images showed an ejection fraction of 59% post stress. Normal ejection fraction is greater than 53%.    There is normal " wall motion at rest.    LV cavity size is normal at rest and normal at stress.    The ECG portion of the study is negative for ischemia.    The patient reported no chest pain during the stress test.    There were no arrhythmias during stress.      Physical Exam:  CONSTITUTIONAL: No fever, no chills  HEENT: Normocephalic, atraumatic,pupils reactive to light                 NECK:  No JVD no carotid bruit  CVS: S1S2+, RRR, systolic murmurs,   LUNGS: Clear decreased breath sounds at the base  ABDOMEN: Soft, NT, BS+  EXTREMITIES: No cyanosis, edema  : No greenfield catheter  NEURO: AAO X 3  PSY: Normal affect      Medication List with Changes/Refills   Current Medications    ALBUTEROL (PROVENTIL/VENTOLIN HFA) 90 MCG/ACTUATION INHALER    2 puffs every 4 (four) hours as needed.    HYDROCHLOROTHIAZIDE (HYDRODIURIL) 12.5 MG TAB    Take 1 tablet (12.5 mg total) by mouth once daily.    LISINOPRIL (PRINIVIL,ZESTRIL) 5 MG TABLET    Take 1 tablet (5 mg total) by mouth once daily.    LISINOPRIL-HYDROCHLOROTHIAZIDE (PRINZIDE,ZESTORETIC) 20-12.5 MG PER TABLET    Take 1 tablet by mouth.    TRULICITY 4.5 MG/0.5 ML PEN INJECTOR    SMARTSI.5 Milligram(s) SUB-Q Once a Week             Assessment:       1. Abnormal finding on cardiovascular stress test    2. Preop cardiovascular exam    3. Essential hypertension    4. Mixed hyperlipidemia    5. Severe obesity    6. Current smoker    7. Diabetes mellitus due to underlying condition with hyperglycemia, without long-term current use of insulin    8. Orthostatic dizziness         Plan:   1. Abnormal myocardial perfusion study.    Patient has a large inferior wall reversible perfusion defect suggestive of ischemia.  Discussed with patient and the family in regards with the abnormal stress myocardial perfusion study and options of cardiac catheterization versus medical management.    Patient is planning to have his knee surgery next week.  At the present time patient has reversible myocardial  perfusion study.  And he prefers to have the procedure done as soon as possible.  2. Discussed with patient in regards with cardiac catheterization and coronary angiography.    Discussed with patient the benefits and options at length in detail.  Coronary Angiogram +/- PCI  AntiPlatelet therapy-  Asprin Plavix Brilinta  Access - Bilateral CFA/ Radial  Allergies : No Iodine Allergy  Pre Hydration IVF  cc /hr  Pre Procedure Medication : Benadryl 25 - 50 mg po prior to procedure X 1  No recent head trauma.  No bleeding issues or Blood in the stools or Urine.   Risks benefits and alternate options were explained to the Patient.    Risks include but not limited to bleeding, allergic reaction to the dye, vascular damage, renal failure with potential need for Dialysis, Infection, Rhythm abnormalities, stroke, myocardial infarction, emergency bypass surgery and death.    The risks is of moderate sedation include hypotension respiratory depression arrhythmias bronchospasm and death.  Patient does understand these risks and wants to proceed with cardiac catheterization.  Should stenting be indicated, patient has agreed to dual antiplatelet therapy for 12 to 18 months with drug-eluting stent and a minimum of 1 month of dual antiplatelet therapy with the use of bare metal stent.  Additionally patient is aware of the noncompliance with medications is likely to result in the stent clotting with heart attack and heart failure and or death.  All questions have been answered to patient's satisfaction.And patient has voiced to proceed with the procedure.  Verbal consent has been obtained and patient is agreeable to proceed with the procedure.     3. Discussed with patient that if he has blockages that a significant then then he would require stent placement.  If he has nonobstructive stenosis then patient can proceed with the knee surgery.  4. Essential hypertension   Patient's blood pressure is stable and is 118/70 is  currently on hydrochlorothiazide 12.5 mg p.o. daily and Prinzide 1 tablet 20 mg-12.5 mg p.o. daily.  5. Orthostatic dizziness   Patient is on hydrochlorothiazide and it potentially cause orthostatic hypotension.    6. Echocardiogram   Patient had an echocardiogram done he has normal LV function and normal RV function.  7. Patient is an active smoker patient to cut back on smoking and preferably completely stop and to seek smoking cessation classes at the hospital.  8. Patient to follow-up with us in the office after the testing.                    Problem List Items Addressed This Visit    None  Visit Diagnoses       Abnormal finding on cardiovascular stress test    -  Primary    Preop cardiovascular exam        Essential hypertension        Mixed hyperlipidemia        Severe obesity        Current smoker        Diabetes mellitus due to underlying condition with hyperglycemia, without long-term current use of insulin        Orthostatic dizziness                No follow-ups on file.

## 2023-10-23 NOTE — H&P (VIEW-ONLY)
"Subjective:    Patient ID:  Cooper Ruggiero is a 52 y.o. male     Chief Complaint   Patient presents with    Results       HPI:  Mr Cooper Ruggiero is a 52 y.o. male is here for results of his stress test and echocardiogram.    Patient is scheduled for knee surgery next week.    And patient is concerned about his knee surgery because of the workmen comes issues.    Patient has an abnormal stress myocardial perfusion study.    Patient denies any chest pain or tightness or heaviness denies any shortness a breath or difficulty in breathing denies any dizziness or lightheadedness or loss of consciousness.        Review of patient's allergies indicates:  No Known Allergies    History reviewed. No pertinent past medical history.  History reviewed. No pertinent surgical history.  Social History     Tobacco Use    Smoking status: Every Day     Current packs/day: 0.50     Average packs/day: 0.5 packs/day for 35.4 years (17.7 ttl pk-yrs)     Types: Cigarettes     Start date: 06/1988    Smokeless tobacco: Former     Types: Chew     History reviewed. No pertinent family history.     Review of Systems:   Constitution: Negative for diaphoresis and fever.   HEENT: Negative for nosebleeds.    Cardiovascular: Negative for chest pain       Intermittent dyspnea on exertion       No leg swelling        No palpitations  Respiratory: Negative for shortness of breath and wheezing.    Hematologic/Lymphatic: Negative for bleeding problem. Does not bruise/bleed easily.   Skin: Negative for color change and rash.   Musculoskeletal: Negative for falls and myalgias.   Gastrointestinal: Negative for hematemesis and hematochezia.   Genitourinary: Negative for hematuria.   Neurological: Negative for dizziness and light-headedness.   Psychiatric/Behavioral: Negative for altered mental status and memory loss.          Objective:        Vitals:    10/23/23 1042   BP: 118/70   Pulse: 93   Resp: 16       No results found for: "WBC", "HGB", "HCT", " ""PLT", "CHOL", "TRIG", "HDL", "LDLDIRECT", "ALT", "AST", "NA", "K", "CL", "CREATININE", "BUN", "CO2", "TSH", "PSA", "INR", "GLUF", "HGBA1C", "MICROALBUR"     ECHOCARDIOGRAM RESULTS  Results for orders placed during the hospital encounter of 10/11/23    Echo    Interpretation Summary    Left Ventricle: The left ventricle is normal in size. Normal wall thickness. Normal wall motion. There is normal systolic function with a visually estimated ejection fraction of 55 - 60%. There is normal diastolic function.    Left Atrium: Left atrium is mildly dilated.    Right Ventricle: Normal right ventricular cavity size. Wall thickness is normal. Right ventricle wall motion  is normal. Systolic function is normal.        CURRENT/PREVIOUS VISIT EKG  Results for orders placed or performed in visit on 08/31/23   IN OFFICE EKG 12-LEAD (to [place])    Collection Time: 08/31/23  1:46 PM    Narrative    Test Reason : R42,    Vent. Rate : 093 BPM     Atrial Rate : 093 BPM     P-R Int : 140 ms          QRS Dur : 096 ms      QT Int : 364 ms       P-R-T Axes : 061 079 068 degrees     QTc Int : 452 ms    Normal sinus rhythm  Nonspecific T wave abnormality  Abnormal ECG  No previous ECGs available  Confirmed by Jayden Mosley MD ([id number]) on 9/21/2023 5:38:51 PM    Referred By: MAIRTZA PARSONS           Confirmed By:Jayden Mosley MD     No valid procedures specified.   Results for orders placed during the hospital encounter of 10/11/23    Nuclear Stress - Cardiology Interpreted    Interpretation Summary    Abnormal myocardial perfusion scan.    There is a severe intensity, large sized, reversible perfusion abnormality that is consistent with ischemia in the inferior wall(s).    There are no other significant perfusion abnormalities.    The gated perfusion images showed an ejection fraction of 66% at rest. The gated perfusion images showed an ejection fraction of 59% post stress. Normal ejection fraction is greater than 53%.    There is normal " wall motion at rest.    LV cavity size is normal at rest and normal at stress.    The ECG portion of the study is negative for ischemia.    The patient reported no chest pain during the stress test.    There were no arrhythmias during stress.      Physical Exam:  CONSTITUTIONAL: No fever, no chills  HEENT: Normocephalic, atraumatic,pupils reactive to light                 NECK:  No JVD no carotid bruit  CVS: S1S2+, RRR, systolic murmurs,   LUNGS: Clear decreased breath sounds at the base  ABDOMEN: Soft, NT, BS+  EXTREMITIES: No cyanosis, edema  : No greenfield catheter  NEURO: AAO X 3  PSY: Normal affect      Medication List with Changes/Refills   Current Medications    ALBUTEROL (PROVENTIL/VENTOLIN HFA) 90 MCG/ACTUATION INHALER    2 puffs every 4 (four) hours as needed.    HYDROCHLOROTHIAZIDE (HYDRODIURIL) 12.5 MG TAB    Take 1 tablet (12.5 mg total) by mouth once daily.    LISINOPRIL (PRINIVIL,ZESTRIL) 5 MG TABLET    Take 1 tablet (5 mg total) by mouth once daily.    LISINOPRIL-HYDROCHLOROTHIAZIDE (PRINZIDE,ZESTORETIC) 20-12.5 MG PER TABLET    Take 1 tablet by mouth.    TRULICITY 4.5 MG/0.5 ML PEN INJECTOR    SMARTSI.5 Milligram(s) SUB-Q Once a Week             Assessment:       1. Abnormal finding on cardiovascular stress test    2. Preop cardiovascular exam    3. Essential hypertension    4. Mixed hyperlipidemia    5. Severe obesity    6. Current smoker    7. Diabetes mellitus due to underlying condition with hyperglycemia, without long-term current use of insulin    8. Orthostatic dizziness         Plan:   1. Abnormal myocardial perfusion study.    Patient has a large inferior wall reversible perfusion defect suggestive of ischemia.  Discussed with patient and the family in regards with the abnormal stress myocardial perfusion study and options of cardiac catheterization versus medical management.    Patient is planning to have his knee surgery next week.  At the present time patient has reversible myocardial  perfusion study.  And he prefers to have the procedure done as soon as possible.  2. Discussed with patient in regards with cardiac catheterization and coronary angiography.    Discussed with patient the benefits and options at length in detail.  Coronary Angiogram +/- PCI  AntiPlatelet therapy-  Asprin Plavix Brilinta  Access - Bilateral CFA/ Radial  Allergies : No Iodine Allergy  Pre Hydration IVF  cc /hr  Pre Procedure Medication : Benadryl 25 - 50 mg po prior to procedure X 1  No recent head trauma.  No bleeding issues or Blood in the stools or Urine.   Risks benefits and alternate options were explained to the Patient.    Risks include but not limited to bleeding, allergic reaction to the dye, vascular damage, renal failure with potential need for Dialysis, Infection, Rhythm abnormalities, stroke, myocardial infarction, emergency bypass surgery and death.    The risks is of moderate sedation include hypotension respiratory depression arrhythmias bronchospasm and death.  Patient does understand these risks and wants to proceed with cardiac catheterization.  Should stenting be indicated, patient has agreed to dual antiplatelet therapy for 12 to 18 months with drug-eluting stent and a minimum of 1 month of dual antiplatelet therapy with the use of bare metal stent.  Additionally patient is aware of the noncompliance with medications is likely to result in the stent clotting with heart attack and heart failure and or death.  All questions have been answered to patient's satisfaction.And patient has voiced to proceed with the procedure.  Verbal consent has been obtained and patient is agreeable to proceed with the procedure.     3. Discussed with patient that if he has blockages that a significant then then he would require stent placement.  If he has nonobstructive stenosis then patient can proceed with the knee surgery.  4. Essential hypertension   Patient's blood pressure is stable and is 118/70 is  currently on hydrochlorothiazide 12.5 mg p.o. daily and Prinzide 1 tablet 20 mg-12.5 mg p.o. daily.  5. Orthostatic dizziness   Patient is on hydrochlorothiazide and it potentially cause orthostatic hypotension.    6. Echocardiogram   Patient had an echocardiogram done he has normal LV function and normal RV function.  7. Patient is an active smoker patient to cut back on smoking and preferably completely stop and to seek smoking cessation classes at the hospital.  8. Patient to follow-up with us in the office after the testing.                    Problem List Items Addressed This Visit    None  Visit Diagnoses       Abnormal finding on cardiovascular stress test    -  Primary    Preop cardiovascular exam        Essential hypertension        Mixed hyperlipidemia        Severe obesity        Current smoker        Diabetes mellitus due to underlying condition with hyperglycemia, without long-term current use of insulin        Orthostatic dizziness                No follow-ups on file.

## 2023-10-24 ENCOUNTER — HOSPITAL ENCOUNTER (OUTPATIENT)
Dept: RADIOLOGY | Facility: HOSPITAL | Age: 53
Discharge: HOME | End: 2023-10-24
Attending: INTERNAL MEDICINE
Payer: COMMERCIAL

## 2023-10-24 ENCOUNTER — HOSPITAL ENCOUNTER (OUTPATIENT)
Dept: PREADMISSION TESTING | Facility: HOSPITAL | Age: 53
Discharge: HOME | End: 2023-10-24
Attending: INTERNAL MEDICINE
Payer: COMMERCIAL

## 2023-10-24 VITALS — WEIGHT: 296.13 LBS | BODY MASS INDEX: 39.07 KG/M2

## 2023-10-24 DIAGNOSIS — R94.31 ABNORMAL EKG: ICD-10-CM

## 2023-10-24 DIAGNOSIS — R94.39 ABNORMAL FINDING ON CARDIOVASCULAR STRESS TEST: ICD-10-CM

## 2023-10-24 DIAGNOSIS — Z01.810 PREOP CARDIOVASCULAR EXAM: ICD-10-CM

## 2023-10-24 LAB
ALBUMIN SERPL BCP-MCNC: 4.2 G/DL (ref 3.5–5.2)
ALP SERPL-CCNC: 97 U/L (ref 55–135)
ALT SERPL W/O P-5'-P-CCNC: 21 U/L (ref 10–44)
ANION GAP SERPL CALC-SCNC: 7 MMOL/L (ref 8–16)
AST SERPL-CCNC: 20 U/L (ref 10–40)
BASOPHILS # BLD AUTO: 0.04 K/UL (ref 0–0.2)
BASOPHILS NFR BLD: 0.4 % (ref 0–1.9)
BILIRUB SERPL-MCNC: 0.4 MG/DL (ref 0.1–1)
BUN SERPL-MCNC: 8 MG/DL (ref 6–20)
CALCIUM SERPL-MCNC: 9.9 MG/DL (ref 8.7–10.5)
CHLORIDE SERPL-SCNC: 99 MMOL/L (ref 95–110)
CO2 SERPL-SCNC: 29 MMOL/L (ref 23–29)
CREAT SERPL-MCNC: 0.7 MG/DL (ref 0.5–1.4)
DIFFERENTIAL METHOD: ABNORMAL
EOSINOPHIL # BLD AUTO: 0.2 K/UL (ref 0–0.5)
EOSINOPHIL NFR BLD: 1.4 % (ref 0–8)
ERYTHROCYTE [DISTWIDTH] IN BLOOD BY AUTOMATED COUNT: 13.8 % (ref 11.5–14.5)
EST. GFR  (NO RACE VARIABLE): >60 ML/MIN/1.73 M^2
GLUCOSE SERPL-MCNC: 98 MG/DL (ref 70–110)
HCT VFR BLD AUTO: 48.3 % (ref 40–54)
HGB BLD-MCNC: 16.6 G/DL (ref 14–18)
IMM GRANULOCYTES # BLD AUTO: 0.04 K/UL (ref 0–0.04)
IMM GRANULOCYTES NFR BLD AUTO: 0.4 % (ref 0–0.5)
LYMPHOCYTES # BLD AUTO: 1.5 K/UL (ref 1–4.8)
LYMPHOCYTES NFR BLD: 13.7 % (ref 18–48)
MCH RBC QN AUTO: 35.9 PG (ref 27–31)
MCHC RBC AUTO-ENTMCNC: 34.4 G/DL (ref 32–36)
MCV RBC AUTO: 104 FL (ref 82–98)
MONOCYTES # BLD AUTO: 0.8 K/UL (ref 0.3–1)
MONOCYTES NFR BLD: 7.6 % (ref 4–15)
NEUTROPHILS # BLD AUTO: 8.1 K/UL (ref 1.8–7.7)
NEUTROPHILS NFR BLD: 76.5 % (ref 38–73)
NRBC BLD-RTO: 0 /100 WBC
PLATELET # BLD AUTO: 319 K/UL (ref 150–450)
PMV BLD AUTO: 9.6 FL (ref 9.2–12.9)
POTASSIUM SERPL-SCNC: 3.8 MMOL/L (ref 3.5–5.1)
PROT SERPL-MCNC: 7.6 G/DL (ref 6–8.4)
RBC # BLD AUTO: 4.63 M/UL (ref 4.6–6.2)
SODIUM SERPL-SCNC: 135 MMOL/L (ref 136–145)
WBC # BLD AUTO: 10.59 K/UL (ref 3.9–12.7)

## 2023-10-24 PROCEDURE — 36415 COLL VENOUS BLD VENIPUNCTURE: CPT | Performed by: INTERNAL MEDICINE

## 2023-10-24 PROCEDURE — 93005 ELECTROCARDIOGRAM TRACING: CPT | Performed by: INTERNAL MEDICINE

## 2023-10-24 PROCEDURE — 93010 ELECTROCARDIOGRAM REPORT: CPT | Mod: ,,, | Performed by: INTERNAL MEDICINE

## 2023-10-24 PROCEDURE — 85025 COMPLETE CBC W/AUTO DIFF WBC: CPT | Performed by: INTERNAL MEDICINE

## 2023-10-24 PROCEDURE — 71046 X-RAY EXAM CHEST 2 VIEWS: CPT | Mod: TC

## 2023-10-24 PROCEDURE — 93010 EKG 12-LEAD: ICD-10-PCS | Mod: ,,, | Performed by: INTERNAL MEDICINE

## 2023-10-24 PROCEDURE — 80053 COMPREHEN METABOLIC PANEL: CPT | Performed by: INTERNAL MEDICINE

## 2023-10-24 RX ORDER — ASPIRIN 81 MG/1
81 TABLET ORAL DAILY
COMMUNITY

## 2023-10-24 NOTE — DISCHARGE INSTRUCTIONS
Nothing to eat or drink after midnight the night before your procedure.  Do not take any medications the morning of your procedure.  Bring all your medications with you in the original pill bottles from pharmacy.  If you take blood thinners, ask your doctor if you should stop taking them.  Do not take metformin 24 hours prior to your procedure.  Adjust your insulin or other diabetes medications if needed.   Do your chlorhexidine wash the night before and morning of your procedure.  Make arrangements for someone you know to drive you home after your procedure. Taxi and Uber are not acceptable.

## 2023-10-25 ENCOUNTER — HOSPITAL ENCOUNTER (OUTPATIENT)
Facility: HOSPITAL | Age: 53
Discharge: HOME | End: 2023-10-25
Attending: INTERNAL MEDICINE | Admitting: INTERNAL MEDICINE
Payer: COMMERCIAL

## 2023-10-25 DIAGNOSIS — I10 ESSENTIAL HYPERTENSION: ICD-10-CM

## 2023-10-25 DIAGNOSIS — Z01.810 PREOP CARDIOVASCULAR EXAM: ICD-10-CM

## 2023-10-25 DIAGNOSIS — R94.39 ABNORMAL FINDING ON CARDIOVASCULAR STRESS TEST: ICD-10-CM

## 2023-10-25 PROCEDURE — 25000003 PHARM REV CODE 250: Performed by: INTERNAL MEDICINE

## 2023-10-25 PROCEDURE — 93458 L HRT ARTERY/VENTRICLE ANGIO: CPT | Mod: 26,,, | Performed by: INTERNAL MEDICINE

## 2023-10-25 PROCEDURE — 93458 L HRT ARTERY/VENTRICLE ANGIO: CPT | Performed by: INTERNAL MEDICINE

## 2023-10-25 PROCEDURE — 63600175 PHARM REV CODE 636 W HCPCS: Performed by: INTERNAL MEDICINE

## 2023-10-25 PROCEDURE — 27201423 OPTIME MED/SURG SUP & DEVICES STERILE SUPPLY: Performed by: INTERNAL MEDICINE

## 2023-10-25 PROCEDURE — C1894 INTRO/SHEATH, NON-LASER: HCPCS | Performed by: INTERNAL MEDICINE

## 2023-10-25 PROCEDURE — 99152 PR MOD CONSCIOUS SEDATION, SAME PHYS, 5+ YRS, FIRST 15 MIN: ICD-10-PCS | Mod: ,,, | Performed by: INTERNAL MEDICINE

## 2023-10-25 PROCEDURE — 93458 PR CATH PLACE/CORON ANGIO, IMG SUPER/INTERP,W LEFT HEART VENTRICULOGRAPHY: ICD-10-PCS | Mod: 26,,, | Performed by: INTERNAL MEDICINE

## 2023-10-25 PROCEDURE — C1769 GUIDE WIRE: HCPCS | Performed by: INTERNAL MEDICINE

## 2023-10-25 PROCEDURE — C1887 CATHETER, GUIDING: HCPCS | Performed by: INTERNAL MEDICINE

## 2023-10-25 PROCEDURE — 99152 MOD SED SAME PHYS/QHP 5/>YRS: CPT | Performed by: INTERNAL MEDICINE

## 2023-10-25 PROCEDURE — 99152 MOD SED SAME PHYS/QHP 5/>YRS: CPT | Mod: ,,, | Performed by: INTERNAL MEDICINE

## 2023-10-25 PROCEDURE — 99153 MOD SED SAME PHYS/QHP EA: CPT | Performed by: INTERNAL MEDICINE

## 2023-10-25 RX ORDER — ACETAMINOPHEN 325 MG/1
650 TABLET ORAL EVERY 4 HOURS PRN
Status: DISCONTINUED | OUTPATIENT
Start: 2023-10-25 | End: 2023-10-25 | Stop reason: HOSPADM

## 2023-10-25 RX ORDER — FENTANYL CITRATE 50 UG/ML
INJECTION, SOLUTION INTRAMUSCULAR; INTRAVENOUS
Status: DISCONTINUED | OUTPATIENT
Start: 2023-10-25 | End: 2023-10-25 | Stop reason: HOSPADM

## 2023-10-25 RX ORDER — MIDAZOLAM HYDROCHLORIDE 1 MG/ML
INJECTION INTRAMUSCULAR; INTRAVENOUS
Status: DISCONTINUED | OUTPATIENT
Start: 2023-10-25 | End: 2023-10-25 | Stop reason: HOSPADM

## 2023-10-25 RX ORDER — LIDOCAINE HYDROCHLORIDE 10 MG/ML
INJECTION, SOLUTION EPIDURAL; INFILTRATION; INTRACAUDAL; PERINEURAL
Status: DISCONTINUED | OUTPATIENT
Start: 2023-10-25 | End: 2023-10-25 | Stop reason: HOSPADM

## 2023-10-25 RX ORDER — SODIUM CHLORIDE 9 MG/ML
INJECTION, SOLUTION INTRAVENOUS ONCE
Status: COMPLETED | OUTPATIENT
Start: 2023-10-25 | End: 2023-10-25

## 2023-10-25 RX ORDER — ONDANSETRON 4 MG/1
8 TABLET, ORALLY DISINTEGRATING ORAL EVERY 8 HOURS PRN
Status: DISCONTINUED | OUTPATIENT
Start: 2023-10-25 | End: 2023-10-25 | Stop reason: HOSPADM

## 2023-10-25 RX ORDER — SODIUM CHLORIDE 9 MG/ML
INJECTION, SOLUTION INTRAVENOUS CONTINUOUS
Status: DISCONTINUED | OUTPATIENT
Start: 2023-10-25 | End: 2023-10-25 | Stop reason: HOSPADM

## 2023-10-25 RX ADMIN — SODIUM CHLORIDE: 0.9 INJECTION, SOLUTION INTRAVENOUS at 06:10

## 2023-10-25 NOTE — Clinical Note
40 ml of contrast were injected throughout the case. 30 mL of contrast was the total wasted during the case. 70 mL was the total amount used during the case.

## 2023-10-26 VITALS
HEART RATE: 83 BPM | SYSTOLIC BLOOD PRESSURE: 117 MMHG | DIASTOLIC BLOOD PRESSURE: 66 MMHG | OXYGEN SATURATION: 95 % | RESPIRATION RATE: 18 BRPM

## 2023-11-14 ENCOUNTER — OFFICE VISIT (OUTPATIENT)
Dept: CARDIOLOGY | Facility: CLINIC | Age: 53
End: 2023-11-14
Payer: COMMERCIAL

## 2023-11-14 VITALS
OXYGEN SATURATION: 97 % | BODY MASS INDEX: 39.36 KG/M2 | WEIGHT: 297 LBS | SYSTOLIC BLOOD PRESSURE: 126 MMHG | DIASTOLIC BLOOD PRESSURE: 80 MMHG | HEART RATE: 97 BPM | HEIGHT: 73 IN

## 2023-11-14 DIAGNOSIS — R94.39 ABNORMAL FINDING ON CARDIOVASCULAR STRESS TEST: Primary | ICD-10-CM

## 2023-11-14 DIAGNOSIS — R42 ORTHOSTATIC DIZZINESS: ICD-10-CM

## 2023-11-14 DIAGNOSIS — I10 ESSENTIAL HYPERTENSION: ICD-10-CM

## 2023-11-14 DIAGNOSIS — R94.31 NONSPECIFIC ABNORMAL ELECTROCARDIOGRAM (ECG) (EKG): ICD-10-CM

## 2023-11-14 DIAGNOSIS — E78.2 MIXED HYPERLIPIDEMIA: ICD-10-CM

## 2023-11-14 DIAGNOSIS — F17.200 CURRENT SMOKER: ICD-10-CM

## 2023-11-14 DIAGNOSIS — E66.01 SEVERE OBESITY: ICD-10-CM

## 2023-11-14 DIAGNOSIS — I25.10 CAD IN NATIVE ARTERY: ICD-10-CM

## 2023-11-14 DIAGNOSIS — E08.65 DIABETES MELLITUS DUE TO UNDERLYING CONDITION WITH HYPERGLYCEMIA, WITHOUT LONG-TERM CURRENT USE OF INSULIN: ICD-10-CM

## 2023-11-14 PROCEDURE — 3074F SYST BP LT 130 MM HG: CPT | Mod: CPTII,S$GLB,, | Performed by: INTERNAL MEDICINE

## 2023-11-14 PROCEDURE — 4010F ACE/ARB THERAPY RXD/TAKEN: CPT | Mod: CPTII,S$GLB,, | Performed by: INTERNAL MEDICINE

## 2023-11-14 PROCEDURE — 3008F BODY MASS INDEX DOCD: CPT | Mod: CPTII,S$GLB,, | Performed by: INTERNAL MEDICINE

## 2023-11-14 PROCEDURE — 4010F PR ACE/ARB THEARPY RXD/TAKEN: ICD-10-PCS | Mod: CPTII,S$GLB,, | Performed by: INTERNAL MEDICINE

## 2023-11-14 PROCEDURE — 99999 PR PBB SHADOW E&M-EST. PATIENT-LVL III: ICD-10-PCS | Mod: PBBFAC,,, | Performed by: INTERNAL MEDICINE

## 2023-11-14 PROCEDURE — 1159F MED LIST DOCD IN RCRD: CPT | Mod: CPTII,S$GLB,, | Performed by: INTERNAL MEDICINE

## 2023-11-14 PROCEDURE — 3008F PR BODY MASS INDEX (BMI) DOCUMENTED: ICD-10-PCS | Mod: CPTII,S$GLB,, | Performed by: INTERNAL MEDICINE

## 2023-11-14 PROCEDURE — 1159F PR MEDICATION LIST DOCUMENTED IN MEDICAL RECORD: ICD-10-PCS | Mod: CPTII,S$GLB,, | Performed by: INTERNAL MEDICINE

## 2023-11-14 PROCEDURE — 99215 PR OFFICE/OUTPT VISIT, EST, LEVL V, 40-54 MIN: ICD-10-PCS | Mod: S$GLB,,, | Performed by: INTERNAL MEDICINE

## 2023-11-14 PROCEDURE — 1160F RVW MEDS BY RX/DR IN RCRD: CPT | Mod: CPTII,S$GLB,, | Performed by: INTERNAL MEDICINE

## 2023-11-14 PROCEDURE — 99215 OFFICE O/P EST HI 40 MIN: CPT | Mod: S$GLB,,, | Performed by: INTERNAL MEDICINE

## 2023-11-14 PROCEDURE — 3079F DIAST BP 80-89 MM HG: CPT | Mod: CPTII,S$GLB,, | Performed by: INTERNAL MEDICINE

## 2023-11-14 PROCEDURE — 3074F PR MOST RECENT SYSTOLIC BLOOD PRESSURE < 130 MM HG: ICD-10-PCS | Mod: CPTII,S$GLB,, | Performed by: INTERNAL MEDICINE

## 2023-11-14 PROCEDURE — 1160F PR REVIEW ALL MEDS BY PRESCRIBER/CLIN PHARMACIST DOCUMENTED: ICD-10-PCS | Mod: CPTII,S$GLB,, | Performed by: INTERNAL MEDICINE

## 2023-11-14 PROCEDURE — 3079F PR MOST RECENT DIASTOLIC BLOOD PRESSURE 80-89 MM HG: ICD-10-PCS | Mod: CPTII,S$GLB,, | Performed by: INTERNAL MEDICINE

## 2023-11-14 PROCEDURE — 99999 PR PBB SHADOW E&M-EST. PATIENT-LVL III: CPT | Mod: PBBFAC,,, | Performed by: INTERNAL MEDICINE

## 2023-11-14 RX ORDER — APIXABAN 2.5 MG/1
2.5 TABLET, FILM COATED ORAL 2 TIMES DAILY
COMMUNITY
Start: 2023-10-31

## 2023-11-14 NOTE — PROGRESS NOTES
Subjective:    Patient ID:  Cooper Ruggiero is a 52 y.o. male     Chief Complaint   Patient presents with    Follow-up       HPI:  Mr Cooper Ruggiero is a 52 y.o. male is here for follow-up.    Patient has been doing well he recently had surgery and is recovering from surgery.  He is currently on Eliquis 2.5 mg p.o. b.i.d. and he is also wearing compression stockings.    He is taking all his medications regularly.    He denies any chest pain or tightness or heaviness denies any shortness a breath or difficulty in breathing denies any dizziness or loss of consciousness.    He is taking all his medications regularly.    Review of patient's allergies indicates:  No Known Allergies    Past Medical History:   Diagnosis Date    Abnormal stress test     Diabetes     Hypertension     Hypotension      Past Surgical History:   Procedure Laterality Date    ANGIOGRAM, CORONARY, WITH LEFT HEART CATHETERIZATION N/A 10/25/2023    Procedure: Angiogram, Coronary, with Left Heart Cath;  Surgeon: Diego Saravia MD;  Location: [facility] CATH[facility]EP LAB;  Service: Cardiology;  Laterality: N/A;    BACK SURGERY      LUNG BIOPSY      REPAIR OF MENISCUS OF KNEE       Social History     Tobacco Use    Smoking status: Every Day     Current packs/day: 0.50     Average packs/day: 0.5 packs/day for 35.5 years (17.7 ttl pk-yrs)     Types: Cigarettes     Start date: 06/1988    Smokeless tobacco: Former     Types: Chew   Substance Use Topics    Alcohol use: Yes     Alcohol/week: 2.0 standard drinks of alcohol     Types: 2 Cans of beer per week    Drug use: Not Currently     History reviewed. No pertinent family history.     Review of Systems:   Constitution: Negative for diaphoresis and fever.   HEENT: Negative for nosebleeds.    Cardiovascular: Negative for chest pain       No dyspnea on exertion       Trace intermittent leg swelling        No palpitations  Respiratory: Negative for shortness of breath and wheezing.    Hematologic/Lymphatic:  Negative for bleeding problem. Does not bruise/bleed easily.   Skin: Negative for color change and rash.   Musculoskeletal: Negative for falls and myalgias.  Recent knee surgery.  Left leg    Gastrointestinal: Negative for hematemesis and hematochezia.   Genitourinary: Negative for hematuria.   Neurological: Negative for dizziness and light-headedness.   Psychiatric/Behavioral: Negative for altered mental status and memory loss.          Objective:        Vitals:    11/14/23 1554   BP: 126/80   Pulse: 97       Lab Results   Component Value Date    WBC 10.59 10/24/2023    HGB 16.6 10/24/2023    HCT 48.3 10/24/2023     10/24/2023    ALT 21 10/24/2023    AST 20 10/24/2023     (L) 10/24/2023    K 3.8 10/24/2023    CL 99 10/24/2023    CREATININE 0.7 10/24/2023    BUN 8 10/24/2023    CO2 29 10/24/2023        ECHOCARDIOGRAM RESULTS  Results for orders placed during the hospital encounter of 10/11/23    Echo    Interpretation Summary    Left Ventricle: The left ventricle is normal in size. Normal wall thickness. Normal wall motion. There is normal systolic function with a visually estimated ejection fraction of 55 - 60%. There is normal diastolic function.    Left Atrium: Left atrium is mildly dilated.    Right Ventricle: Normal right ventricular cavity size. Wall thickness is normal. Right ventricle wall motion  is normal. Systolic function is normal.        CURRENT/PREVIOUS VISIT EKG  Results for orders placed or performed during the hospital encounter of 10/24/23   EKG 12-lead    Collection Time: 10/24/23  2:13 PM    Narrative    Test Reason : R94.31,    Vent. Rate : 096 BPM     Atrial Rate : 096 BPM     P-R Int : 144 ms          QRS Dur : 084 ms      QT Int : 456 ms       P-R-T Axes : 067 078 063 degrees     QTc Int : 576 ms    Normal sinus rhythm  Right atrial enlargement  Nonspecific T wave abnormality  Abnormal ECG  When compared with ECG of 31-AUG-2023 13:46,  QT has lengthened  Confirmed by Melany CHAN,  Kolby ([id number]) on 10/27/2023 1:10:15 PM    Referred By:             Confirmed By:Kolby Mosley MD     No valid procedures specified.   Results for orders placed during the hospital encounter of 10/11/23    Nuclear Stress - Cardiology Interpreted    Interpretation Summary    Abnormal myocardial perfusion scan.    There is a severe intensity, large sized, reversible perfusion abnormality that is consistent with ischemia in the inferior wall(s).    There are no other significant perfusion abnormalities.    The gated perfusion images showed an ejection fraction of 66% at rest. The gated perfusion images showed an ejection fraction of 59% post stress. Normal ejection fraction is greater than 53%.    There is normal wall motion at rest.    LV cavity size is normal at rest and normal at stress.    The ECG portion of the study is negative for ischemia.    The patient reported no chest pain during the stress test.    There were no arrhythmias during stress.      Physical Exam:  CONSTITUTIONAL: No fever, no chills  HEENT: Normocephalic, atraumatic,pupils reactive to light                 NECK:  No JVD no carotid bruit  CVS: S1S2+, RRR, systolic murmurs,   LUNGS: Clear bilateral decreased breath sounds at the bases ABDOMEN: Soft, NT, BS+  EXTREMITIES: No cyanosis, edema  : No greenfield catheter  NEURO: AAO X 3  PSY: Normal affect      Medication List with Changes/Refills   Current Medications    ALBUTEROL (PROVENTIL/VENTOLIN HFA) 90 MCG/ACTUATION INHALER    2 puffs every 4 (four) hours as needed.    ASPIRIN (ECOTRIN) 81 MG EC TABLET    Take 81 mg by mouth once daily.    ELIQUIS 2.5 MG TAB    Take 2.5 mg by mouth 2 (two) times daily.    LISINOPRIL-HYDROCHLOROTHIAZIDE (PRINZIDE,ZESTORETIC) 20-12.5 MG PER TABLET    Take 1 tablet by mouth once daily.    TRULICITY 4.5 MG/0.5 ML PEN INJECTOR    Inject 4.5 mg into the skin every 7 days.   Discontinued Medications    HYDROCHLOROTHIAZIDE (HYDRODIURIL) 12.5 MG TAB    Take 1 tablet (12.5  mg total) by mouth once daily.    LISINOPRIL (PRINIVIL,ZESTRIL) 5 MG TABLET    Take 1 tablet (5 mg total) by mouth once daily.     Summary         There was no angiographic evidence of significant coronary artery disease.    The pre-procedure left ventricular end diastolic pressure was 5.     The procedure log was documented by Documenter: Dary Chowdary RT and verified by Diego Saravia MD.     Date: 10/26/2023  Time: 11:24 PM        Assessment:       1. Abnormal finding on cardiovascular stress test    2. CAD in native artery    3. Essential hypertension    4. Mixed hyperlipidemia    5. Severe obesity    6. Current smoker    7. Diabetes mellitus due to underlying condition with hyperglycemia, without long-term current use of insulin    8. Orthostatic dizziness    9. Nonspecific abnormal electrocardiogram (ECG) (EKG)         Plan:   1. Abnormal stress test/CAD.    Patient had an abnormal stress Cardiolite study and underwent cardiac catheterization.  And he had patent coronaries.    2. Essential hypertension  Patient's blood pressure is well controlled is 126/80 and he is currently on lisinopril-hydrochlorothiazide 20-12.5 mg tablet.  Continue the same.  His blood pressure is well controlled.  3. Knee surgery   Patient underwent left knee surgery and is recovering very well.  And is currently on Eliquis 2.5 mg p.o. b.i.d. and aspirin 81 mg p.o. daily and is also wearing compression stockings.  4. Mixed hyperlipidemia   Patient is not on statin therapy.  And he follows up with his primary physician.    5. Obesity   Patient is significantly obese.  He is currently weighing 297 lb patient to lose weight patient to cut back on food.  Now that his knee surgery is done patient to walk on regular basis.  6. Current smoker   Patient is smoking and but he is also made up his mind to slowly cut back on the amount of cigarettes and eventually stop smoking.  Patient also to seek smoking cessation classes at the  [facility].  7. Diabetes mellitus   He is currently on Trulicity and seems to be working well for him.  He is going to follow-up with his primary physician in regards with it.  8. Orthostatic dizziness   He is not had any further major issues.  Patient at the present time is wearing thigh-high compression stockings.    9. EKG  Reviewed his EKG from October 24, 2023.  Patient has normal sinus rhythm with a heart rate of 96 beats per minute nonspecific ST T-wave changes.  10. I will see him back in the office in 6 months time.              Problem List Items Addressed This Visit    None  Visit Diagnoses       Abnormal finding on cardiovascular stress test    -  Primary    CAD in native artery        Essential hypertension        Mixed hyperlipidemia        Severe obesity        Current smoker        Diabetes mellitus due to underlying condition with hyperglycemia, without long-term current use of insulin        Orthostatic dizziness        Nonspecific abnormal electrocardiogram (ECG) (EKG)                No follow-ups on file.

## (undated) DEVICE — CATHETER DIAGNOSTIC DXTERITY 5FR JR4.0

## (undated) DEVICE — GUIDEWIRE J TIP EXCHANGE FIXED CORE 260

## (undated) DEVICE — CATHETER DIAGNOSTIC DXTERITY 5FR JL3.5

## (undated) DEVICE — TR BAND

## (undated) DEVICE — SHEATH INTRODUCER SLENDER 6FX10CM